# Patient Record
Sex: FEMALE | Race: WHITE | NOT HISPANIC OR LATINO
[De-identification: names, ages, dates, MRNs, and addresses within clinical notes are randomized per-mention and may not be internally consistent; named-entity substitution may affect disease eponyms.]

---

## 2017-01-03 ENCOUNTER — APPOINTMENT (OUTPATIENT)
Dept: ANTEPARTUM | Facility: CLINIC | Age: 27
End: 2017-01-03

## 2017-01-03 ENCOUNTER — ASOB RESULT (OUTPATIENT)
Age: 27
End: 2017-01-03

## 2017-02-22 ENCOUNTER — EMERGENCY (EMERGENCY)
Facility: HOSPITAL | Age: 27
LOS: 1 days | Discharge: HOME | End: 2017-02-22
Attending: EMERGENCY MEDICINE | Admitting: EMERGENCY MEDICINE
Payer: COMMERCIAL

## 2017-02-22 VITALS
RESPIRATION RATE: 20 BRPM | TEMPERATURE: 98 F | OXYGEN SATURATION: 97 % | SYSTOLIC BLOOD PRESSURE: 108 MMHG | DIASTOLIC BLOOD PRESSURE: 74 MMHG | HEART RATE: 124 BPM

## 2017-02-22 VITALS
OXYGEN SATURATION: 100 % | HEART RATE: 105 BPM | TEMPERATURE: 98 F | DIASTOLIC BLOOD PRESSURE: 68 MMHG | SYSTOLIC BLOOD PRESSURE: 108 MMHG | RESPIRATION RATE: 18 BRPM

## 2017-02-22 DIAGNOSIS — O21.9 VOMITING OF PREGNANCY, UNSPECIFIED: ICD-10-CM

## 2017-02-22 DIAGNOSIS — Z3A.27 27 WEEKS GESTATION OF PREGNANCY: ICD-10-CM

## 2017-02-22 DIAGNOSIS — O99.012 ANEMIA COMPLICATING PREGNANCY, SECOND TRIMESTER: ICD-10-CM

## 2017-02-22 DIAGNOSIS — J06.9 ACUTE UPPER RESPIRATORY INFECTION, UNSPECIFIED: ICD-10-CM

## 2017-02-22 LAB
ALBUMIN SERPL ELPH-MCNC: 3.4 G/DL — SIGNIFICANT CHANGE UP (ref 3.3–5)
ALP SERPL-CCNC: 74 U/L — SIGNIFICANT CHANGE UP (ref 40–120)
ALT FLD-CCNC: 18 U/L RC — SIGNIFICANT CHANGE UP (ref 10–45)
ANION GAP SERPL CALC-SCNC: 13 MMOL/L — SIGNIFICANT CHANGE UP (ref 5–17)
APPEARANCE UR: CLEAR — SIGNIFICANT CHANGE UP
AST SERPL-CCNC: 21 U/L — SIGNIFICANT CHANGE UP (ref 10–40)
BACTERIA # UR AUTO: ABNORMAL /HPF
BASOPHILS # BLD AUTO: 0 K/UL — SIGNIFICANT CHANGE UP (ref 0–0.2)
BASOPHILS NFR BLD AUTO: 0.3 % — SIGNIFICANT CHANGE UP (ref 0–2)
BILIRUB SERPL-MCNC: 0.2 MG/DL — SIGNIFICANT CHANGE UP (ref 0.2–1.2)
BILIRUB UR-MCNC: NEGATIVE — SIGNIFICANT CHANGE UP
BUN SERPL-MCNC: 10 MG/DL — SIGNIFICANT CHANGE UP (ref 7–23)
CALCIUM SERPL-MCNC: 8.4 MG/DL — SIGNIFICANT CHANGE UP (ref 8.4–10.5)
CHLORIDE SERPL-SCNC: 101 MMOL/L — SIGNIFICANT CHANGE UP (ref 96–108)
CO2 SERPL-SCNC: 22 MMOL/L — SIGNIFICANT CHANGE UP (ref 22–31)
COLOR SPEC: YELLOW — SIGNIFICANT CHANGE UP
CREAT SERPL-MCNC: 0.55 MG/DL — SIGNIFICANT CHANGE UP (ref 0.5–1.3)
DIFF PNL FLD: ABNORMAL
EOSINOPHIL # BLD AUTO: 0 K/UL — SIGNIFICANT CHANGE UP (ref 0–0.5)
EOSINOPHIL NFR BLD AUTO: 0.8 % — SIGNIFICANT CHANGE UP (ref 0–6)
EPI CELLS # UR: SIGNIFICANT CHANGE UP /HPF
FLUAV H1 2009 PAND RNA SPEC QL NAA+PROBE: DETECTED
GLUCOSE SERPL-MCNC: 99 MG/DL — SIGNIFICANT CHANGE UP (ref 70–99)
GLUCOSE UR QL: NEGATIVE — SIGNIFICANT CHANGE UP
HCT VFR BLD CALC: 29.1 % — LOW (ref 34.5–45)
HGB BLD-MCNC: 9.8 G/DL — LOW (ref 11.5–15.5)
KETONES UR-MCNC: NEGATIVE — SIGNIFICANT CHANGE UP
LEUKOCYTE ESTERASE UR-ACNC: ABNORMAL
LYMPHOCYTES # BLD AUTO: 0.8 K/UL — LOW (ref 1–3.3)
LYMPHOCYTES # BLD AUTO: 12.7 % — LOW (ref 13–44)
MCHC RBC-ENTMCNC: 28.4 PG — SIGNIFICANT CHANGE UP (ref 27–34)
MCHC RBC-ENTMCNC: 33.6 GM/DL — SIGNIFICANT CHANGE UP (ref 32–36)
MCV RBC AUTO: 84.5 FL — SIGNIFICANT CHANGE UP (ref 80–100)
MONOCYTES # BLD AUTO: 0.8 K/UL — SIGNIFICANT CHANGE UP (ref 0–0.9)
MONOCYTES NFR BLD AUTO: 13 % — SIGNIFICANT CHANGE UP (ref 2–14)
NEUTROPHILS # BLD AUTO: 4.6 K/UL — SIGNIFICANT CHANGE UP (ref 1.8–7.4)
NEUTROPHILS NFR BLD AUTO: 73.3 % — SIGNIFICANT CHANGE UP (ref 43–77)
NITRITE UR-MCNC: NEGATIVE — SIGNIFICANT CHANGE UP
PH UR: 6.5 — SIGNIFICANT CHANGE UP (ref 4.8–8)
PLATELET # BLD AUTO: 210 K/UL — SIGNIFICANT CHANGE UP (ref 150–400)
POTASSIUM SERPL-MCNC: 3.5 MMOL/L — SIGNIFICANT CHANGE UP (ref 3.5–5.3)
POTASSIUM SERPL-SCNC: 3.5 MMOL/L — SIGNIFICANT CHANGE UP (ref 3.5–5.3)
PROT SERPL-MCNC: 6.7 G/DL — SIGNIFICANT CHANGE UP (ref 6–8.3)
PROT UR-MCNC: SIGNIFICANT CHANGE UP
RAPID RVP RESULT: DETECTED
RBC # BLD: 3.44 M/UL — LOW (ref 3.8–5.2)
RBC # FLD: 11.6 % — SIGNIFICANT CHANGE UP (ref 10.3–14.5)
RBC CASTS # UR COMP ASSIST: SIGNIFICANT CHANGE UP /HPF (ref 0–2)
SODIUM SERPL-SCNC: 136 MMOL/L — SIGNIFICANT CHANGE UP (ref 135–145)
SP GR SPEC: 1.01 — SIGNIFICANT CHANGE UP (ref 1.01–1.02)
UROBILINOGEN FLD QL: NEGATIVE — SIGNIFICANT CHANGE UP
WBC # BLD: 6.3 K/UL — SIGNIFICANT CHANGE UP (ref 3.8–10.5)
WBC # FLD AUTO: 6.3 K/UL — SIGNIFICANT CHANGE UP (ref 3.8–10.5)
WBC UR QL: SIGNIFICANT CHANGE UP /HPF (ref 0–5)

## 2017-02-22 PROCEDURE — 87633 RESP VIRUS 12-25 TARGETS: CPT

## 2017-02-22 PROCEDURE — 80053 COMPREHEN METABOLIC PANEL: CPT

## 2017-02-22 PROCEDURE — 99284 EMERGENCY DEPT VISIT MOD MDM: CPT | Mod: 25

## 2017-02-22 PROCEDURE — 87798 DETECT AGENT NOS DNA AMP: CPT

## 2017-02-22 PROCEDURE — 81001 URINALYSIS AUTO W/SCOPE: CPT

## 2017-02-22 PROCEDURE — 76815 OB US LIMITED FETUS(S): CPT

## 2017-02-22 PROCEDURE — 99285 EMERGENCY DEPT VISIT HI MDM: CPT | Mod: 25

## 2017-02-22 PROCEDURE — 85027 COMPLETE CBC AUTOMATED: CPT

## 2017-02-22 PROCEDURE — 87581 M.PNEUMON DNA AMP PROBE: CPT

## 2017-02-22 PROCEDURE — 76815 OB US LIMITED FETUS(S): CPT | Mod: 26

## 2017-02-22 PROCEDURE — 87486 CHLMYD PNEUM DNA AMP PROBE: CPT

## 2017-02-22 PROCEDURE — 87086 URINE CULTURE/COLONY COUNT: CPT

## 2017-02-22 RX ORDER — SODIUM CHLORIDE 9 MG/ML
1000 INJECTION, SOLUTION INTRAVENOUS ONCE
Qty: 0 | Refills: 0 | Status: COMPLETED | OUTPATIENT
Start: 2017-02-22 | End: 2017-02-22

## 2017-02-22 RX ORDER — SODIUM CHLORIDE 9 MG/ML
1000 INJECTION INTRAMUSCULAR; INTRAVENOUS; SUBCUTANEOUS ONCE
Qty: 0 | Refills: 0 | Status: COMPLETED | OUTPATIENT
Start: 2017-02-22 | End: 2017-02-22

## 2017-02-22 RX ADMIN — SODIUM CHLORIDE 1000 MILLILITER(S): 9 INJECTION INTRAMUSCULAR; INTRAVENOUS; SUBCUTANEOUS at 07:39

## 2017-02-22 RX ADMIN — Medication 75 MILLIGRAM(S): at 09:33

## 2017-02-22 RX ADMIN — SODIUM CHLORIDE 2000 MILLILITER(S): 9 INJECTION, SOLUTION INTRAVENOUS at 06:54

## 2017-02-22 NOTE — ED PROVIDER NOTE - PROGRESS NOTE DETAILS
FHR 150s. Tachycardia and BP improved. Pt notified of anemia. No dark BM. f/u Ob/Gyn. Pt awaiting fluid bolus to be completed. THOMAS

## 2017-02-22 NOTE — ED PROVIDER NOTE - OBJECTIVE STATEMENT
26F  27 wks pregnant PMH anxiety, hashimoto's thyroiditis p/w fever, chills, dry cough, sinus pressure, myalgias, gen weakness.  Pt. started having runny nose/sore throat this past weekend.  Symptoms worsened on evening of 17.  Measured 101F prior to arrival.  Experienced inc urinary frequency 2 nights ago.  Had 1 episode of NBNB emesis this AM.  Went to [facility] yesterday, had negative flu test, was prescribed Amoxicillin.  Took 1 dose last AM and last PM.  Took 2 Tylenol tabs last night.  No complications in pregnancy thus far.

## 2017-02-22 NOTE — ED PROVIDER NOTE - SHIFT CHANGE DETAILS
[] IVF  [] Labs, UA, RVP  25 y/o  at 27 weeks gestation without complication to date p/w fever sinus congestion and dysuria. Sinus tachycardia on arrival improving with IVF. Continue home Amoxicillin for left sinus infection, supportive car and Ob f/u. If UTI stable for PO abx outpt. f/u with Ob for anemia. NORMA

## 2017-02-22 NOTE — ED ADULT NURSE NOTE - OBJECTIVE STATEMENT
26 y.o female presents to the ed c.o fever and sinus pressure with non productive cough since Saturday . patient is 27 weeks pregnant, , hx of hypothyroidism and anxiety. went to urgent care yesterday, negative flu swab, sent home with amoxicillin for sinus infection, patient has been treating fevers at home with tylenol, last dose around 9pm last night. patient is afebrile, c.o headache, facial pressure, nausea, x1 episode of vomiting this morning, body aches, chills and weakness. patient denies abdominal pain/chest pain/sob/vaginal bleeding/diarrhea. tachycardia to the 110s. Patient undressed and placed into gown, call bell in hand and side rails up for safety. warm blanket provided, vital signs stable, pt in no acute distress.

## 2017-02-22 NOTE — ED ADULT NURSE REASSESSMENT NOTE - NS ED NURSE REASSESS COMMENT FT1
Pt received from PEDRITO Allen in [facility], alert and oriented times three, breathing spontaneous, unlabored without distress on room air, NAD, denies pain, IV fluids infusing, awaits UA sample, RVP results

## 2017-02-22 NOTE — ED ADULT TRIAGE NOTE - CHIEF COMPLAINT QUOTE
fever/27 wks pregnant  Seen at urgent care yesterday and dx with sinusitis. Started on amoxicillin. Flu swab (-) yesterday

## 2017-02-22 NOTE — ED PROVIDER NOTE - ATTENDING CONTRIBUTION TO CARE
I was physically present for the E/M service provided. I agree with above history, physical, and plan which I have reviewed and edited where appropriate. I was physically present for the key portions of the service provided.    See sign out note  Non-toxic appearing  Lungs CTA  +Left sinus TTP  Abd soft NT  FHR 150s on US bedside

## 2017-02-22 NOTE — ED PROCEDURE NOTE - PROCEDURE ADDITIONAL DETAILS
focused Ed ultrasound transabdominal OB to evaluate fetal well being.   uterus scanned in two planes in gray scale and m mode  fetal heart rate measured _150s_  fetal movement noted  impression: live IUP  NORMA  [id number]

## 2017-02-22 NOTE — ED PROVIDER NOTE - MEDICAL DECISION MAKING DETAILS
Likely viral URI vs UTI.  F/U CBC, CMP, UA, urine culture.  1L LR bolus given for hypotension to SBP 91.

## 2017-02-23 LAB
CULTURE RESULTS: NO GROWTH — SIGNIFICANT CHANGE UP
SPECIMEN SOURCE: SIGNIFICANT CHANGE UP

## 2017-05-03 ENCOUNTER — ASOB RESULT (OUTPATIENT)
Age: 27
End: 2017-05-03

## 2017-05-03 ENCOUNTER — APPOINTMENT (OUTPATIENT)
Dept: ANTEPARTUM | Facility: CLINIC | Age: 27
End: 2017-05-03

## 2017-05-06 ENCOUNTER — INPATIENT (INPATIENT)
Facility: HOSPITAL | Age: 27
LOS: 1 days | Discharge: HOME | End: 2017-05-08
Attending: OBSTETRICS & GYNECOLOGY | Admitting: OBSTETRICS & GYNECOLOGY
Payer: COMMERCIAL

## 2017-05-06 VITALS
DIASTOLIC BLOOD PRESSURE: 78 MMHG | TEMPERATURE: 98 F | OXYGEN SATURATION: 96 % | RESPIRATION RATE: 18 BRPM | SYSTOLIC BLOOD PRESSURE: 116 MMHG | HEART RATE: 73 BPM

## 2017-05-06 DIAGNOSIS — Z3A.00 WEEKS OF GESTATION OF PREGNANCY NOT SPECIFIED: ICD-10-CM

## 2017-05-06 DIAGNOSIS — O47.1 FALSE LABOR AT OR AFTER 37 COMPLETED WEEKS OF GESTATION: ICD-10-CM

## 2017-05-06 DIAGNOSIS — O26.899 OTHER SPECIFIED PREGNANCY RELATED CONDITIONS, UNSPECIFIED TRIMESTER: ICD-10-CM

## 2017-05-06 LAB
BASOPHILS # BLD AUTO: 0 K/UL — SIGNIFICANT CHANGE UP (ref 0–0.2)
BLD GP AB SCN SERPL QL: NEGATIVE — SIGNIFICANT CHANGE UP
EOSINOPHIL # BLD AUTO: 0 K/UL — SIGNIFICANT CHANGE UP (ref 0–0.5)
HCT VFR BLD CALC: 28.2 % — LOW (ref 34.5–45)
HGB BLD-MCNC: 9.6 G/DL — LOW (ref 11.5–15.5)
HYPOCHROMIA BLD QL: SLIGHT — SIGNIFICANT CHANGE UP
LYMPHOCYTES # BLD AUTO: 1.6 K/UL — SIGNIFICANT CHANGE UP (ref 1–3.3)
LYMPHOCYTES # BLD AUTO: 10 % — LOW (ref 13–44)
MCHC RBC-ENTMCNC: 25.4 PG — LOW (ref 27–34)
MCHC RBC-ENTMCNC: 34.2 GM/DL — SIGNIFICANT CHANGE UP (ref 32–36)
MCV RBC AUTO: 74.5 FL — LOW (ref 80–100)
MICROCYTES BLD QL: SLIGHT — SIGNIFICANT CHANGE UP
MONOCYTES # BLD AUTO: 0.6 K/UL — SIGNIFICANT CHANGE UP (ref 0–0.9)
MONOCYTES NFR BLD AUTO: 3 % — SIGNIFICANT CHANGE UP (ref 2–14)
NEUTROPHILS # BLD AUTO: 6.3 K/UL — SIGNIFICANT CHANGE UP (ref 1.8–7.4)
NEUTROPHILS NFR BLD AUTO: 87 % — HIGH (ref 43–77)
PLAT MORPH BLD: NORMAL — SIGNIFICANT CHANGE UP
PLATELET # BLD AUTO: 208 K/UL — SIGNIFICANT CHANGE UP (ref 150–400)
RBC # BLD: 3.78 M/UL — LOW (ref 3.8–5.2)
RBC # FLD: 13 % — SIGNIFICANT CHANGE UP (ref 10.3–14.5)
RBC BLD AUTO: ABNORMAL
RH IG SCN BLD-IMP: POSITIVE — SIGNIFICANT CHANGE UP
WBC # BLD: 8.6 K/UL — SIGNIFICANT CHANGE UP (ref 3.8–10.5)
WBC # FLD AUTO: 8.6 K/UL — SIGNIFICANT CHANGE UP (ref 3.8–10.5)

## 2017-05-06 RX ORDER — VENLAFAXINE HCL 75 MG
0 CAPSULE, EXT RELEASE 24 HR ORAL
Qty: 0 | Refills: 0 | COMMUNITY

## 2017-05-06 RX ORDER — SODIUM CHLORIDE 9 MG/ML
1000 INJECTION, SOLUTION INTRAVENOUS
Qty: 0 | Refills: 0 | Status: DISCONTINUED | OUTPATIENT
Start: 2017-05-06 | End: 2017-05-07

## 2017-05-06 RX ORDER — OXYTOCIN 10 UNIT/ML
4 VIAL (ML) INJECTION
Qty: 30 | Refills: 0 | Status: DISCONTINUED | OUTPATIENT
Start: 2017-05-06 | End: 2017-05-08

## 2017-05-06 RX ORDER — VENLAFAXINE HCL 75 MG
75 CAPSULE, EXT RELEASE 24 HR ORAL DAILY
Qty: 0 | Refills: 0 | Status: DISCONTINUED | OUTPATIENT
Start: 2017-05-06 | End: 2017-05-08

## 2017-05-06 RX ORDER — ONDANSETRON 8 MG/1
4 TABLET, FILM COATED ORAL ONCE
Qty: 0 | Refills: 0 | Status: COMPLETED | OUTPATIENT
Start: 2017-05-06 | End: 2017-05-06

## 2017-05-06 RX ORDER — SODIUM CHLORIDE 9 MG/ML
500 INJECTION, SOLUTION INTRAVENOUS ONCE
Qty: 0 | Refills: 0 | Status: COMPLETED | OUTPATIENT
Start: 2017-05-06 | End: 2017-05-06

## 2017-05-06 RX ORDER — LEVOTHYROXINE SODIUM 125 MCG
25 TABLET ORAL DAILY
Qty: 0 | Refills: 0 | Status: DISCONTINUED | OUTPATIENT
Start: 2017-05-06 | End: 2017-05-08

## 2017-05-06 RX ORDER — ONDANSETRON 8 MG/1
4 TABLET, FILM COATED ORAL ONCE
Qty: 0 | Refills: 0 | Status: DISCONTINUED | OUTPATIENT
Start: 2017-05-06 | End: 2017-05-08

## 2017-05-06 RX ORDER — LEVOTHYROXINE SODIUM 125 MCG
1 TABLET ORAL
Qty: 0 | Refills: 0 | COMMUNITY

## 2017-05-06 RX ORDER — CITRIC ACID/SODIUM CITRATE 300-500 MG
15 SOLUTION, ORAL ORAL EVERY 4 HOURS
Qty: 0 | Refills: 0 | Status: DISCONTINUED | OUTPATIENT
Start: 2017-05-06 | End: 2017-05-07

## 2017-05-06 RX ORDER — OXYTOCIN 10 UNIT/ML
333.33 VIAL (ML) INJECTION
Qty: 20 | Refills: 0 | Status: DISCONTINUED | OUTPATIENT
Start: 2017-05-06 | End: 2017-05-06

## 2017-05-06 RX ADMIN — SODIUM CHLORIDE 1000 MILLILITER(S): 9 INJECTION, SOLUTION INTRAVENOUS at 15:27

## 2017-05-06 RX ADMIN — Medication 25 MICROGRAM(S): at 20:00

## 2017-05-06 RX ADMIN — Medication 4 MILLIUNIT(S)/MIN: at 15:45

## 2017-05-06 RX ADMIN — Medication 15 MILLILITER(S): at 16:47

## 2017-05-06 RX ADMIN — ONDANSETRON 4 MILLIGRAM(S): 8 TABLET, FILM COATED ORAL at 22:32

## 2017-05-06 RX ADMIN — Medication 125 MILLIUNIT(S)/MIN: at 23:19

## 2017-05-06 RX ADMIN — SODIUM CHLORIDE 125 MILLILITER(S): 9 INJECTION, SOLUTION INTRAVENOUS at 15:28

## 2017-05-07 LAB
HCT VFR BLD CALC: 28.1 % — LOW (ref 34.5–45)
HGB BLD-MCNC: 9.2 G/DL — LOW (ref 11.5–15.5)
T PALLIDUM AB TITR SER: NEGATIVE — SIGNIFICANT CHANGE UP

## 2017-05-07 RX ORDER — OXYCODONE HYDROCHLORIDE 5 MG/1
5 TABLET ORAL EVERY 4 HOURS
Qty: 0 | Refills: 0 | Status: DISCONTINUED | OUTPATIENT
Start: 2017-05-06 | End: 2017-05-08

## 2017-05-07 RX ORDER — GLYCERIN ADULT
1 SUPPOSITORY, RECTAL RECTAL AT BEDTIME
Qty: 0 | Refills: 0 | Status: DISCONTINUED | OUTPATIENT
Start: 2017-05-07 | End: 2017-05-08

## 2017-05-07 RX ORDER — HYDROCORTISONE 1 %
1 OINTMENT (GRAM) TOPICAL EVERY 4 HOURS
Qty: 0 | Refills: 0 | Status: DISCONTINUED | OUTPATIENT
Start: 2017-05-06 | End: 2017-05-07

## 2017-05-07 RX ORDER — AER TRAVELER 0.5 G/1
1 SOLUTION RECTAL; TOPICAL EVERY 4 HOURS
Qty: 0 | Refills: 0 | Status: DISCONTINUED | OUTPATIENT
Start: 2017-05-06 | End: 2017-05-07

## 2017-05-07 RX ORDER — ACETAMINOPHEN 500 MG
975 TABLET ORAL EVERY 6 HOURS
Qty: 0 | Refills: 0 | Status: COMPLETED | OUTPATIENT
Start: 2017-05-06 | End: 2018-04-04

## 2017-05-07 RX ORDER — FERROUS SULFATE 325(65) MG
325 TABLET ORAL
Qty: 0 | Refills: 0 | Status: DISCONTINUED | OUTPATIENT
Start: 2017-05-07 | End: 2017-05-08

## 2017-05-07 RX ORDER — PRAMOXINE HYDROCHLORIDE 150 MG/15G
1 AEROSOL, FOAM RECTAL EVERY 4 HOURS
Qty: 0 | Refills: 0 | Status: DISCONTINUED | OUTPATIENT
Start: 2017-05-06 | End: 2017-05-07

## 2017-05-07 RX ORDER — LANOLIN
1 OINTMENT (GRAM) TOPICAL EVERY 6 HOURS
Qty: 0 | Refills: 0 | Status: DISCONTINUED | OUTPATIENT
Start: 2017-05-07 | End: 2017-05-08

## 2017-05-07 RX ORDER — IBUPROFEN 200 MG
600 TABLET ORAL EVERY 6 HOURS
Qty: 0 | Refills: 0 | Status: COMPLETED | OUTPATIENT
Start: 2017-05-06 | End: 2018-04-04

## 2017-05-07 RX ORDER — PRAMOXINE HYDROCHLORIDE 150 MG/15G
1 AEROSOL, FOAM RECTAL EVERY 4 HOURS
Qty: 0 | Refills: 0 | Status: DISCONTINUED | OUTPATIENT
Start: 2017-05-07 | End: 2017-05-08

## 2017-05-07 RX ORDER — DIBUCAINE 1 %
1 OINTMENT (GRAM) RECTAL EVERY 4 HOURS
Qty: 0 | Refills: 0 | Status: DISCONTINUED | OUTPATIENT
Start: 2017-05-07 | End: 2017-05-08

## 2017-05-07 RX ORDER — OXYCODONE HYDROCHLORIDE 5 MG/1
5 TABLET ORAL
Qty: 0 | Refills: 0 | Status: DISCONTINUED | OUTPATIENT
Start: 2017-05-06 | End: 2017-05-08

## 2017-05-07 RX ORDER — SODIUM CHLORIDE 9 MG/ML
3 INJECTION INTRAMUSCULAR; INTRAVENOUS; SUBCUTANEOUS EVERY 8 HOURS
Qty: 0 | Refills: 0 | Status: DISCONTINUED | OUTPATIENT
Start: 2017-05-06 | End: 2017-05-07

## 2017-05-07 RX ORDER — ASCORBIC ACID 60 MG
250 TABLET,CHEWABLE ORAL
Qty: 0 | Refills: 0 | Status: DISCONTINUED | OUTPATIENT
Start: 2017-05-07 | End: 2017-05-08

## 2017-05-07 RX ORDER — AER TRAVELER 0.5 G/1
1 SOLUTION RECTAL; TOPICAL EVERY 4 HOURS
Qty: 0 | Refills: 0 | Status: DISCONTINUED | OUTPATIENT
Start: 2017-05-07 | End: 2017-05-08

## 2017-05-07 RX ORDER — DIBUCAINE 1 %
1 OINTMENT (GRAM) RECTAL EVERY 4 HOURS
Qty: 0 | Refills: 0 | Status: DISCONTINUED | OUTPATIENT
Start: 2017-05-06 | End: 2017-05-07

## 2017-05-07 RX ORDER — SIMETHICONE 80 MG/1
80 TABLET, CHEWABLE ORAL EVERY 6 HOURS
Qty: 0 | Refills: 0 | Status: DISCONTINUED | OUTPATIENT
Start: 2017-05-07 | End: 2017-05-08

## 2017-05-07 RX ORDER — TETANUS TOXOID, REDUCED DIPHTHERIA TOXOID AND ACELLULAR PERTUSSIS VACCINE, ADSORBED 5; 2.5; 8; 8; 2.5 [IU]/.5ML; [IU]/.5ML; UG/.5ML; UG/.5ML; UG/.5ML
0.5 SUSPENSION INTRAMUSCULAR ONCE
Qty: 0 | Refills: 0 | Status: DISCONTINUED | OUTPATIENT
Start: 2017-05-07 | End: 2017-05-08

## 2017-05-07 RX ORDER — OXYTOCIN 10 UNIT/ML
41.67 VIAL (ML) INJECTION
Qty: 20 | Refills: 0 | Status: DISCONTINUED | OUTPATIENT
Start: 2017-05-06 | End: 2017-05-07

## 2017-05-07 RX ORDER — DOCUSATE SODIUM 100 MG
100 CAPSULE ORAL
Qty: 0 | Refills: 0 | Status: DISCONTINUED | OUTPATIENT
Start: 2017-05-07 | End: 2017-05-08

## 2017-05-07 RX ORDER — MAGNESIUM HYDROXIDE 400 MG/1
30 TABLET, CHEWABLE ORAL
Qty: 0 | Refills: 0 | Status: DISCONTINUED | OUTPATIENT
Start: 2017-05-07 | End: 2017-05-08

## 2017-05-07 RX ORDER — DIPHENHYDRAMINE HCL 50 MG
25 CAPSULE ORAL EVERY 6 HOURS
Qty: 0 | Refills: 0 | Status: DISCONTINUED | OUTPATIENT
Start: 2017-05-07 | End: 2017-05-08

## 2017-05-07 RX ORDER — KETOROLAC TROMETHAMINE 30 MG/ML
30 SYRINGE (ML) INJECTION ONCE
Qty: 0 | Refills: 0 | Status: DISCONTINUED | OUTPATIENT
Start: 2017-05-06 | End: 2017-05-07

## 2017-05-07 RX ORDER — OXYTOCIN 10 UNIT/ML
41.67 VIAL (ML) INJECTION
Qty: 20 | Refills: 0 | Status: DISCONTINUED | OUTPATIENT
Start: 2017-05-07 | End: 2017-05-08

## 2017-05-07 RX ORDER — IBUPROFEN 200 MG
600 TABLET ORAL EVERY 6 HOURS
Qty: 0 | Refills: 0 | Status: DISCONTINUED | OUTPATIENT
Start: 2017-05-07 | End: 2017-05-08

## 2017-05-07 RX ORDER — ACETAMINOPHEN 500 MG
975 TABLET ORAL EVERY 6 HOURS
Qty: 0 | Refills: 0 | Status: DISCONTINUED | OUTPATIENT
Start: 2017-05-07 | End: 2017-05-08

## 2017-05-07 RX ORDER — HYDROCORTISONE 1 %
1 OINTMENT (GRAM) TOPICAL EVERY 4 HOURS
Qty: 0 | Refills: 0 | Status: DISCONTINUED | OUTPATIENT
Start: 2017-05-07 | End: 2017-05-08

## 2017-05-07 RX ORDER — SODIUM CHLORIDE 9 MG/ML
3 INJECTION INTRAMUSCULAR; INTRAVENOUS; SUBCUTANEOUS EVERY 8 HOURS
Qty: 0 | Refills: 0 | Status: DISCONTINUED | OUTPATIENT
Start: 2017-05-07 | End: 2017-05-08

## 2017-05-07 RX ADMIN — Medication 75 MILLIGRAM(S): at 13:01

## 2017-05-07 RX ADMIN — Medication 30 MILLIGRAM(S): at 00:54

## 2017-05-07 RX ADMIN — Medication 600 MILLIGRAM(S): at 16:00

## 2017-05-07 RX ADMIN — SODIUM CHLORIDE 3 MILLILITER(S): 9 INJECTION INTRAMUSCULAR; INTRAVENOUS; SUBCUTANEOUS at 06:00

## 2017-05-07 RX ADMIN — Medication 975 MILLIGRAM(S): at 18:21

## 2017-05-07 RX ADMIN — Medication 600 MILLIGRAM(S): at 15:28

## 2017-05-07 RX ADMIN — Medication 600 MILLIGRAM(S): at 10:00

## 2017-05-07 RX ADMIN — Medication 250 MILLIGRAM(S): at 18:20

## 2017-05-07 RX ADMIN — Medication 600 MILLIGRAM(S): at 09:25

## 2017-05-07 RX ADMIN — Medication 25 MICROGRAM(S): at 22:16

## 2017-05-07 RX ADMIN — Medication 325 MILLIGRAM(S): at 18:20

## 2017-05-08 ENCOUNTER — TRANSCRIPTION ENCOUNTER (OUTPATIENT)
Age: 27
End: 2017-05-08

## 2017-05-08 VITALS
HEART RATE: 74 BPM | SYSTOLIC BLOOD PRESSURE: 108 MMHG | DIASTOLIC BLOOD PRESSURE: 75 MMHG | TEMPERATURE: 98 F | RESPIRATION RATE: 18 BRPM | OXYGEN SATURATION: 98 %

## 2017-05-08 PROCEDURE — 85018 HEMOGLOBIN: CPT

## 2017-05-08 PROCEDURE — 86900 BLOOD TYPING SEROLOGIC ABO: CPT

## 2017-05-08 PROCEDURE — 86850 RBC ANTIBODY SCREEN: CPT

## 2017-05-08 PROCEDURE — 59025 FETAL NON-STRESS TEST: CPT

## 2017-05-08 PROCEDURE — G0463: CPT

## 2017-05-08 PROCEDURE — 85027 COMPLETE CBC AUTOMATED: CPT

## 2017-05-08 PROCEDURE — 86780 TREPONEMA PALLIDUM: CPT

## 2017-05-08 PROCEDURE — 86901 BLOOD TYPING SEROLOGIC RH(D): CPT

## 2017-05-08 PROCEDURE — 59050 FETAL MONITOR W/REPORT: CPT

## 2017-05-08 RX ORDER — AMOXICILLIN 250 MG/5ML
1 SUSPENSION, RECONSTITUTED, ORAL (ML) ORAL
Qty: 0 | Refills: 0 | COMMUNITY

## 2017-05-08 RX ORDER — IBUPROFEN 200 MG
1 TABLET ORAL
Qty: 0 | Refills: 0 | COMMUNITY
Start: 2017-05-08

## 2017-05-08 RX ORDER — ACETAMINOPHEN 500 MG
3 TABLET ORAL
Qty: 0 | Refills: 0 | COMMUNITY
Start: 2017-05-08

## 2017-05-08 RX ADMIN — Medication 600 MILLIGRAM(S): at 05:41

## 2017-05-08 RX ADMIN — Medication 100 MILLIGRAM(S): at 11:23

## 2017-05-08 RX ADMIN — Medication 1 TABLET(S): at 11:23

## 2017-05-08 RX ADMIN — Medication 600 MILLIGRAM(S): at 06:41

## 2017-05-08 RX ADMIN — Medication 600 MILLIGRAM(S): at 11:23

## 2017-05-08 RX ADMIN — Medication 75 MILLIGRAM(S): at 11:25

## 2017-05-08 NOTE — DISCHARGE NOTE OB - MEDICATION SUMMARY - MEDICATIONS TO TAKE
I will START or STAY ON the medications listed below when I get home from the hospital:    acetaminophen 325 mg oral tablet  -- 3 tab(s) by mouth every 6 hours  -- Indication: For pain    ibuprofen 600 mg oral tablet  -- 1 tab(s) by mouth every 6 hours  -- Indication: For pain    Effexor 75 mg oral tablet  --  by mouth once a day  -- Indication: For anxiety    Synthroid 25 mcg (0.025 mg) oral tablet  -- 1 tab(s) by mouth once a day  -- Indication: For hypothyroidism

## 2017-05-08 NOTE — DISCHARGE NOTE OB - PATIENT PORTAL LINK FT
“You can access the Follow[facility] Patient Portal, offered by [facility], by registering with the following website: [email or web address]”

## 2017-05-08 NOTE — DISCHARGE NOTE OB - CARE PROVIDER_API CALL
Robin Lin (MD), Obstetrics  Gynecology  [address] [facility] [place] [address]  Phone: [phone]  Fax: [phone]

## 2017-05-14 ENCOUNTER — EMERGENCY (EMERGENCY)
Facility: HOSPITAL | Age: 27
LOS: 1 days | Discharge: HOME | End: 2017-05-14
Attending: EMERGENCY MEDICINE | Admitting: EMERGENCY MEDICINE
Payer: COMMERCIAL

## 2017-05-14 VITALS
OXYGEN SATURATION: 100 % | RESPIRATION RATE: 18 BRPM | TEMPERATURE: 98 F | SYSTOLIC BLOOD PRESSURE: 136 MMHG | DIASTOLIC BLOOD PRESSURE: 78 MMHG | HEART RATE: 85 BPM

## 2017-05-14 VITALS
SYSTOLIC BLOOD PRESSURE: 116 MMHG | HEART RATE: 93 BPM | OXYGEN SATURATION: 99 % | TEMPERATURE: 98 F | DIASTOLIC BLOOD PRESSURE: 82 MMHG | RESPIRATION RATE: 18 BRPM

## 2017-05-14 DIAGNOSIS — R11.2 NAUSEA WITH VOMITING, UNSPECIFIED: ICD-10-CM

## 2017-05-14 LAB
ALBUMIN SERPL ELPH-MCNC: 3.8 G/DL — SIGNIFICANT CHANGE UP (ref 3.3–5)
ALP SERPL-CCNC: 130 U/L — HIGH (ref 40–120)
ALT FLD-CCNC: 38 U/L RC — SIGNIFICANT CHANGE UP (ref 10–45)
ANION GAP SERPL CALC-SCNC: 18 MMOL/L — HIGH (ref 5–17)
APPEARANCE UR: ABNORMAL
AST SERPL-CCNC: 25 U/L — SIGNIFICANT CHANGE UP (ref 10–40)
BACTERIA # UR AUTO: ABNORMAL /HPF
BASOPHILS # BLD AUTO: 0 K/UL — SIGNIFICANT CHANGE UP (ref 0–0.2)
BASOPHILS NFR BLD AUTO: 0.4 % — SIGNIFICANT CHANGE UP (ref 0–2)
BILIRUB SERPL-MCNC: 0.2 MG/DL — SIGNIFICANT CHANGE UP (ref 0.2–1.2)
BILIRUB UR-MCNC: NEGATIVE — SIGNIFICANT CHANGE UP
BUN SERPL-MCNC: 14 MG/DL — SIGNIFICANT CHANGE UP (ref 7–23)
CALCIUM SERPL-MCNC: 9.1 MG/DL — SIGNIFICANT CHANGE UP (ref 8.4–10.5)
CHLORIDE SERPL-SCNC: 104 MMOL/L — SIGNIFICANT CHANGE UP (ref 96–108)
CO2 SERPL-SCNC: 20 MMOL/L — LOW (ref 22–31)
COLOR SPEC: YELLOW — SIGNIFICANT CHANGE UP
CREAT SERPL-MCNC: 0.77 MG/DL — SIGNIFICANT CHANGE UP (ref 0.5–1.3)
DIFF PNL FLD: ABNORMAL
EOSINOPHIL # BLD AUTO: 0 K/UL — SIGNIFICANT CHANGE UP (ref 0–0.5)
EOSINOPHIL NFR BLD AUTO: 0.2 % — SIGNIFICANT CHANGE UP (ref 0–6)
EPI CELLS # UR: SIGNIFICANT CHANGE UP /HPF
GLUCOSE SERPL-MCNC: 99 MG/DL — SIGNIFICANT CHANGE UP (ref 70–99)
GLUCOSE UR QL: NEGATIVE — SIGNIFICANT CHANGE UP
HCT VFR BLD CALC: 35.4 % — SIGNIFICANT CHANGE UP (ref 34.5–45)
HGB BLD-MCNC: 11.8 G/DL — SIGNIFICANT CHANGE UP (ref 11.5–15.5)
KETONES UR-MCNC: NEGATIVE — SIGNIFICANT CHANGE UP
LEUKOCYTE ESTERASE UR-ACNC: ABNORMAL
LYMPHOCYTES # BLD AUTO: 1.7 K/UL — SIGNIFICANT CHANGE UP (ref 1–3.3)
LYMPHOCYTES # BLD AUTO: 20.5 % — SIGNIFICANT CHANGE UP (ref 13–44)
MCHC RBC-ENTMCNC: 26 PG — LOW (ref 27–34)
MCHC RBC-ENTMCNC: 33.4 GM/DL — SIGNIFICANT CHANGE UP (ref 32–36)
MCV RBC AUTO: 77.8 FL — LOW (ref 80–100)
MONOCYTES # BLD AUTO: 0.5 K/UL — SIGNIFICANT CHANGE UP (ref 0–0.9)
MONOCYTES NFR BLD AUTO: 6.5 % — SIGNIFICANT CHANGE UP (ref 2–14)
NEUTROPHILS # BLD AUTO: 6.2 K/UL — SIGNIFICANT CHANGE UP (ref 1.8–7.4)
NEUTROPHILS NFR BLD AUTO: 72.5 % — SIGNIFICANT CHANGE UP (ref 43–77)
NITRITE UR-MCNC: NEGATIVE — SIGNIFICANT CHANGE UP
PH UR: 6 — SIGNIFICANT CHANGE UP (ref 5–8)
PLATELET # BLD AUTO: 333 K/UL — SIGNIFICANT CHANGE UP (ref 150–400)
POTASSIUM SERPL-MCNC: 4 MMOL/L — SIGNIFICANT CHANGE UP (ref 3.5–5.3)
POTASSIUM SERPL-SCNC: 4 MMOL/L — SIGNIFICANT CHANGE UP (ref 3.5–5.3)
PROT SERPL-MCNC: 7.4 G/DL — SIGNIFICANT CHANGE UP (ref 6–8.3)
PROT UR-MCNC: SIGNIFICANT CHANGE UP
RBC # BLD: 4.55 M/UL — SIGNIFICANT CHANGE UP (ref 3.8–5.2)
RBC # FLD: 13.4 % — SIGNIFICANT CHANGE UP (ref 10.3–14.5)
RBC CASTS # UR COMP ASSIST: SIGNIFICANT CHANGE UP /HPF (ref 0–2)
SODIUM SERPL-SCNC: 142 MMOL/L — SIGNIFICANT CHANGE UP (ref 135–145)
SP GR SPEC: 1.01 — SIGNIFICANT CHANGE UP (ref 1.01–1.02)
UROBILINOGEN FLD QL: NEGATIVE — SIGNIFICANT CHANGE UP
WBC # BLD: 8.5 K/UL — SIGNIFICANT CHANGE UP (ref 3.8–10.5)
WBC # FLD AUTO: 8.5 K/UL — SIGNIFICANT CHANGE UP (ref 3.8–10.5)
WBC UR QL: SIGNIFICANT CHANGE UP /HPF (ref 0–5)

## 2017-05-14 PROCEDURE — 96375 TX/PRO/DX INJ NEW DRUG ADDON: CPT

## 2017-05-14 PROCEDURE — 81001 URINALYSIS AUTO W/SCOPE: CPT

## 2017-05-14 PROCEDURE — 80053 COMPREHEN METABOLIC PANEL: CPT

## 2017-05-14 PROCEDURE — 87086 URINE CULTURE/COLONY COUNT: CPT

## 2017-05-14 PROCEDURE — 99284 EMERGENCY DEPT VISIT MOD MDM: CPT

## 2017-05-14 PROCEDURE — 96374 THER/PROPH/DIAG INJ IV PUSH: CPT

## 2017-05-14 PROCEDURE — 99284 EMERGENCY DEPT VISIT MOD MDM: CPT | Mod: 25

## 2017-05-14 PROCEDURE — 85027 COMPLETE CBC AUTOMATED: CPT

## 2017-05-14 RX ORDER — SODIUM CHLORIDE 9 MG/ML
2000 INJECTION INTRAMUSCULAR; INTRAVENOUS; SUBCUTANEOUS ONCE
Qty: 0 | Refills: 0 | Status: COMPLETED | OUTPATIENT
Start: 2017-05-14 | End: 2017-05-14

## 2017-05-14 RX ORDER — ACETAMINOPHEN 500 MG
1000 TABLET ORAL ONCE
Qty: 0 | Refills: 0 | Status: COMPLETED | OUTPATIENT
Start: 2017-05-14 | End: 2017-05-14

## 2017-05-14 RX ORDER — CEPHALEXIN 500 MG
1 CAPSULE ORAL
Qty: 14 | Refills: 0 | OUTPATIENT
Start: 2017-05-14 | End: 2017-05-21

## 2017-05-14 RX ORDER — ONDANSETRON 8 MG/1
4 TABLET, FILM COATED ORAL ONCE
Qty: 0 | Refills: 0 | Status: COMPLETED | OUTPATIENT
Start: 2017-05-14 | End: 2017-05-14

## 2017-05-14 RX ORDER — CEPHALEXIN 500 MG
500 CAPSULE ORAL ONCE
Qty: 0 | Refills: 0 | Status: COMPLETED | OUTPATIENT
Start: 2017-05-14 | End: 2017-05-14

## 2017-05-14 RX ADMIN — Medication 400 MILLIGRAM(S): at 16:13

## 2017-05-14 RX ADMIN — SODIUM CHLORIDE 2000 MILLILITER(S): 9 INJECTION INTRAMUSCULAR; INTRAVENOUS; SUBCUTANEOUS at 15:19

## 2017-05-14 RX ADMIN — Medication 500 MILLIGRAM(S): at 18:27

## 2017-05-14 RX ADMIN — ONDANSETRON 4 MILLIGRAM(S): 8 TABLET, FILM COATED ORAL at 15:19

## 2017-05-14 RX ADMIN — Medication 1000 MILLIGRAM(S): at 17:43

## 2017-05-14 NOTE — ED PROVIDER NOTE - PHYSICAL EXAMINATION
Attending Glasgow: Gen: NAD, heent: atrauamtic, eomi, perrla, dry mucous membranes, op pink, uvula midline, neck; nttp, no nuchal rigidity, chest: nttp, no crepitus, cv: rrr, no murmurs, lungs: ctab, abd: soft, nontender, nondistended, no peritoneal signs, +BS, no guarding, ext: wwp, neg homans, skin: no rash, neuro: awake and alert, following commands, speech clear, sensation and strength intact, no focal deficits

## 2017-05-14 NOTE — ED PROVIDER NOTE - CARE PLAN
Principal Discharge DX:	UTI (urinary tract infection)  Instructions for follow-up, activity and diet:	Complete course of K Principal Discharge DX:	UTI (urinary tract infection)  Instructions for follow-up, activity and diet:	Complete course of Keflex as prescribed.  Follow-up with your primary doctor and obgyn.  Return for any new/worsening symptoms or concerns.

## 2017-05-14 NOTE — ED PROVIDER NOTE - OBJECTIVE STATEMENT
Attending Glasgow: 28 y/o female s/p recent vaginal delivery 5 days ago, uncomplicated presenting with n/v/d. pt states symptoms started yesterday. multiple episodes of nbnb vomiting. multiple episodes of brown stool. no blood in the stool. no sick contacts. decreased po intact. denies any abdominal pain. low grade temp of 99 at home. still has some spotting since delivery.   GYN: deja

## 2017-05-14 NOTE — ED ADULT NURSE NOTE - OBJECTIVE STATEMENT
26 Y/O F via walkin presenting with abd pain, n,v,d and a lower grade fever as per pt. Pt states this started a few days ago. She states she may have contracted a GI bug. She states she had a vaginal delivery on the 6th. She states she had vaginal bleeding that is normal. She states she has decrease PO intake. She states she is not breastfeeding. Pt is aaox4. Gross neuro intact. Lungs clear throughout bilat. S1  and S2 heard. Abd soft, nontender, nondistended. + bs in all 4 quadrants. Pt states she has dysuria. Skin w.d.i. Denies soaking 1 pad per hour. Pt denies chest pain, sob, dizziness, weakness. Safety and comfort measures maintained. Family at bedside.

## 2017-05-14 NOTE — ED PROVIDER NOTE - MEDICAL DECISION MAKING DETAILS
Attending Glasgow: 26 y/o female s/p recent vaginal delivery presenting with n/v/d. pt has mild vaginal spotting since delivery. no abdominal pain currently and abd soft making endometritis less likely. symptoms suggestive of gastroenteritis. will hydrate, d/w ob and re-eval.

## 2017-05-14 NOTE — ED PROVIDER NOTE - PLAN OF CARE
Complete course of K Complete course of Keflex as prescribed.  Follow-up with your primary doctor and obgyn.  Return for any new/worsening symptoms or concerns.

## 2017-05-14 NOTE — ED PROVIDER NOTE - PROGRESS NOTE DETAILS
Patient cleared by OBGYN, no concern for endometritis.  patient well-appearing.  will d/c with keflex and followup.     Dafne Davis MD

## 2017-05-15 LAB
CULTURE RESULTS: SIGNIFICANT CHANGE UP
SPECIMEN SOURCE: SIGNIFICANT CHANGE UP

## 2017-05-16 NOTE — ED POST DISCHARGE NOTE - OTHER COMMUNICATION
spoke w patient and explained that she needs a repeat urine culture, her symptoms are mildly improved. continue antibiotic and follow up with PMD. come back to ER if fever, chills or back pain occur. Explained this to patient - Chucky Blanchard PA-C

## 2018-11-30 NOTE — PATIENT PROFILE OB - ANTEPARTUM INPATIENT
Physical Therapy Evaluation    Visit Count: 1   Plan of Care: 11/30/2018 Through: 1/25/2019  Insurance Information: [facility]/CHOICE PLUS    NO AUTH  $3500 DED(MET)  $6850 OUT OF POCKET(MET)  100% COV  NO VISIT LIMITS,MEDICAL REVIEW AFTER 30TH VISIT(0 USED)  CPT CODE 83692 IS VALID AND BILLABLE  CPT CODE 62218 IS VALID AND BILLABLE  REF#[id number]    Referred by: Sg Balbuena MD; Next provider visit (if known/scheduled): As needed  Medical Diagnosis (from order):  Cervical myofascial pain  Treatment Diagnosis: Cervical and right upper trap symptoms with increased pain/symptoms, impaired posture, impaired strength, impaired scapulohumeral rhythm, impaired muscle length/flexibility, impaired activity tolerance    Date of onset/injury: 11-30-14  Diagnosis Precautions: None  Chart reviewed at time of initial evaluation (relevant co-morbidities, allergies, tests and medications listed):   Patient Active Problem List   Diagnosis   • PCOS (polycystic ovarian syndrome)   • Chronic neck pain   • Hyperpiesia   • Migraine   • Essential hypertension   • Gastroesophageal reflux disease   • Labial hypertrophy   • Vulvar furuncle   • Ductal carcinoma in situ (DCIS) of left breast   • Cervical spondylosis without myelopathy     SUBJECTIVE   Description of Problem and/or Mechanism of Injury: Original neck injury 4 years ago, when 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  Patient underwent extensive conservative treatment including PT, chiropractic care and massage therapy.  Underwent Radio Frequency Ablation 10-24-18 and reports no headaches since.    Patient also states that she is also currently working with OT as she was diagnosed with left breast cancer earlier this year and underwent a lumpectomy (6-27-18) and 21 treatments of radiation.      Pain:  Intensity (0-10 scale): Current: 1; Pain Range (best-worst): 1-2  Location: Right upper trap  Quality/Description: Ache, Sore,  Tight  Relieving/Alleviating factors: rest, ice  Any tripping, stumbling, loss of balance: No  Any changes in bowel/bladder function: No  Pain with coughing and sneezing: No  Any numbness/tingling or radiating pain: No  Night pain: No    Function:  Limitations/exacerbating factors: pain, difficulty with computer tasks, driving, grooming/hygiene/self care activities, house/yard work, lifting/carrying, meal/food prep, pushing/pulling, reaching motions  Prior level: independent with all activities of daily living and instrumental activities of daily living  Patient Goal: Decrease pain and tightness.  Improve right UE strength to allow return to all activities.    Prior Treatment: chiropractic services and massage services in the past year for current condition. Hospitalization, home health services or skilled nursing facility in the last 30 days: No, per patient.  Home Environment/Social Support: Patient lives with children; intermittent assist from family/friends available.    Safety:  Do you feel safe at home, work and/or school? yes, per patient  Patient denies 2 or more falls or an unexplained fall with injury in the last year, further testing not required     OBJECTIVE   Posture/Observation:  Flexed spine, rounded shoulders and forward head.  Right > left upper trap overuse.      Cervical Range of Motion (degrees)  Date Initial    Flexion (80-90) 35 ° *   Extension (70) 50 °    Lateral Flexion Left (20-45) 25 ° *   Lateral Flexion Right (20-45) 30 °    Rotation Left (70-90)  50 ° *   Rotation Right (70-90)  65 °    standard testing positions unless otherwise noted; ranges are reported in active range of motion unless noted AA=active assistive range of motion and P=passive range of motion; norms included in (); * =more tightness than pain    Strength (out of 5)   Left Right   Date Initial Initial   Deltoids (C5) 4+ 4   Shoulder External Rotators (C6/7) 4 3+   Shoulder Internal Rotators (C6/7) 4+ 4   Biceps (C6) 5  4+   standard testing positions unless otherwise noted; nerve root level included in ( ); * =pain  Weaker on right without reports of increased pain.    Muscle Flexibility    Left Right  Left Right   Sub Occipitals  X Scalenes  X   Levator Scapulae  X Upper Trapezius  X   Pectoralis Major X X Pectoralis Minor X X   Latissimus        X=impairment assessed; amount of impairment maybe indicated by min=minimal impairment, mod=moderate impairment, sev=severe impairment    Palpation:  Tight and tender right > left upper trap/levator    Shoulder ROM  Motion Left Right   Date 11-30-18 11-30-18   Flexion 130 °  160 °    Scaption 135 °  155 °    Abduction 110 °  150 °    ER NT NT   IR NT NT   Tight on left due to left lumpectomy on 6-27-18.  Right upper trap tightness at end range of all right shoulder motions.    Outcome Measures:   Neck Disability Index (NDI): Score: NDI Score Calculated: 20 % at initial evaluation (scored 0-100, higher score indicates higher disability)     Initial Treatment   Initial evaluation completed.    Therapeutic Exercise:   Exercise Repetitions Sets Position/Cues   Cervical ROM 2 1 All except extension   Low trap scapular squeezes 5 1 Cues for upper trap relaxation   Doorway pec stretches 2 1          Comments: Patient reports difficulty preventing upper trap overuse    Manual Therapy:   Brief supine manual traction and suboccipital release    Skilled input: Exercise instruction and cues for improve upper trap relaxation/lower trap recruitment.    Initial Home Program:  Exercise: Date issued Date DC Comments   Cervical ROM 11-30-18  All except extension   Low trap scapular squeezes 11-30-18     Doorway pec stretches 11-30-18     Ice 11-30-18             Writer verbally educated the patient and received verbal consent from the patient on hand placement, positioning of patient, and techniques to be performed today including HEP and how they are pertinent to the patient's plan of care.      Suggestions for next session as indicated: progress per plan of care, manual therapy as needed.  Progress exercises for scapular control, upper trap relaxation and gradual right UE strength as tolerated.    ASSESSMENT   44 year old female patient has signs and symptoms consistent with right sided neck pain that has reported functional limitations listed above.     Patient will benefit from skilled therapy and rehab potential is good based on assessment above   Predicted patient presentation: Low (stable) - Patient comorbidities and complexities, as defined above, will have little effect on progress for prescribed plan of care.    PLAN   Goals: To be obtained by end of this plan of care:  1. Patient will be independent with progressed and modified home exercise program   2. Decrease pain/symptoms to 1/10 at worst  3. Improve involved strength to 4+/5  4. Improve involved range of motion to 160 °   through improvements listed above patient will:  5. Be able to complete upper body dressing without pain/difficulty  6. Be able to reach overhead, out to side, behind back without pain/difficulty to improve completion of household tasks  7. Be able to sleep 6 hours without disruption from pain  8. Be able to sit for 60 minutes with minimal pain/difficulty to improve computer/desk work  9. Be able to lift 25# with minimal pain/difficulty to improve returning to community activities  10. Neck Disability Index: Patient will complete form to reflect an improved score from initial score of 20% to less than or equal to 5% (scored 0-100, higher score indicates higher disability) to indicate pt reported improvement in function/disability/impairment (minimal detectable change: 21%).     The following skilled interventions to be implemented to achieve above:  Manual Therapy (14689)  Neuromuscular Re-Education (69281)  Therapeutic Activity (88083)  Therapeutic Exercise (25301)  Ultrasound/Phonophoresis  (93257)    Frequency/Duration: 2 times per week for 8 weeks with tapering as the patient progresses    patient involved in and agreed to plan of care and goals.   Attendance policy provided at time of evaluation.        Patient Education:   Who will be receiving education: patient  Are they ready to learn: yes  Preferred learning style: written, verbal, demonstration  Barriers to learning: no barriers apparent at this time   Result of initial outlined education: Verbalizes understanding, Demonstrates understanding and Needs reinforcement    THERAPY DAILY BILLING   Insurance: 1. AMDL 2. N/A    Evaluation Procedures:  Physical Therapy Evaluation: Low Complexity    Timed Procedures:  Therapeutic Exercise, 15 minutes    Untimed Procedures:  No untimed codes were used on this date of service    Total Treatment Time: 45 minutes   Yes

## 2019-02-27 ENCOUNTER — EMERGENCY (EMERGENCY)
Facility: HOSPITAL | Age: 29
LOS: 1 days | End: 2019-02-27

## 2019-02-27 VITALS
TEMPERATURE: 99 F | SYSTOLIC BLOOD PRESSURE: 133 MMHG | RESPIRATION RATE: 18 BRPM | HEART RATE: 85 BPM | WEIGHT: 154.98 LBS | HEIGHT: 65 IN | OXYGEN SATURATION: 100 % | DIASTOLIC BLOOD PRESSURE: 89 MMHG

## 2019-02-27 NOTE — ED ADULT TRIAGE NOTE - CHIEF COMPLAINT QUOTE
abd pain/cramping with vaginal bleeding x 2 weeks -  possible LMP 2/16/19. Patient has an appt scheduled with OBGYN tomorrow

## 2019-03-15 ENCOUNTER — RESULT REVIEW (OUTPATIENT)
Age: 29
End: 2019-03-15

## 2019-09-04 ENCOUNTER — APPOINTMENT (OUTPATIENT)
Dept: ORTHOPEDIC SURGERY | Facility: CLINIC | Age: 29
End: 2019-09-04
Payer: COMMERCIAL

## 2019-09-04 ENCOUNTER — TRANSCRIPTION ENCOUNTER (OUTPATIENT)
Age: 29
End: 2019-09-04

## 2019-09-04 VITALS
BODY MASS INDEX: 24.99 KG/M2 | WEIGHT: 150 LBS | DIASTOLIC BLOOD PRESSURE: 90 MMHG | HEIGHT: 65 IN | SYSTOLIC BLOOD PRESSURE: 124 MMHG | HEART RATE: 111 BPM

## 2019-09-04 PROCEDURE — 73110 X-RAY EXAM OF WRIST: CPT | Mod: RT

## 2019-09-04 PROCEDURE — 99203 OFFICE O/P NEW LOW 30 MIN: CPT

## 2019-09-04 NOTE — PHYSICAL EXAM
[de-identified] : Constitutional: Well-nourished, well-developed, No acute distress\par Respiratory:  Good respiratory effort, no SOB\par Lymphatic: No regional lymphadenopathy, no lymphedema\par Psychiatric: Pleasant and normal affect, alert and oriented x3\par Skin: Clean dry and intact B/L UE/LE\par Musculoskeletal: normal except where as noted in regional exam\par \par \par right Wrist:\par APPEARANCE: + swelling at the distal radius, MCPs\par POSITIVE TENDERNESS: + radial syloid, snuffbox 2nd, 3rd, and 4th mcps\par NONTENDER:  scapholunate, DRUJ, TFCC, FCU/FCR, ECU/ECRL/ECRB, ulnar styloid, CMC, and MCP joints. \par ROM: limited in all directions due to pain\par RESISTIVE TESTING: pain with wrist flexion/extension, and radial/ulnar deviation. \par Vasc: 2+ radial pulse\par Neuro: AIN, PIN, Ulnar nerve intact to motor\par Sensation: Intact to light touch throughout\par B/L Shoulders:  No asymmetry, malalignment, or swelling, Full ROM, 5/5 strength in flexion/ext, Abd/Add, IR/ER, Joints stable\par B/L Elbows:  No asymmetry, malalignment, or swelling, Full ROM, 5/5 strength in flex/ext, pronation/supination, Joints stable\par \par  [de-identified] : \par The following radiographs were ordered and read by me during this patient's visit. I reviewed each radiograph in detail with the patient and discussed the findings as highlighted below. \par \par 3 views of the right wrist were obtained today.  In one view there is a hairline transverse opacity in the radial styloid that may represent a small fracture.  There is also another small hairline opacity at the base of the 5th metacarpal seen on AP view.  There is no displacement or malalignment. There are no degenerative changes seen. There is no malalignment. No other obvious osseous abnormality. Otherwise unremarkable.

## 2019-09-04 NOTE — DISCUSSION/SUMMARY
[de-identified] : Bello is a 29 year old female who presents with right wrist pain after a fall onto an outstretched hand.  On exam she is particularly tender over the radial styloid and may have a small hairline fracture there.  She is not as tender over the MCP joints.  I have placed her in a wrist splint and will see her back in 1 week for repeat imaging and to monitor symptoms.  She can take NSAIDs or tylenol as needed for pain.\par \par \par This note was generated using dragon medical dictation software.  A reasonable effort has been made for proofreading its contents, but typos may still remain.  If there are any questions or points of clarification needed please notify my office.\par \par Alivia Wade MD, EdM\par Sports Medicine PM&R

## 2019-09-04 NOTE — HISTORY OF PRESENT ILLNESS
[de-identified] : Bello is a 29F who presents with right wrist injury after a fall.  Patient reports she was washing her dog yesterday when it pulled her forward and she fell onto her outstretched hand.  She had immediate pain, but did not have significant swelling until this morning.  Pain is mostly located at the radial styloid.  It is described as sharp and throbbing and radiates into her fingers. Worse with wrist flexion/extension, better with ice.  Denies numbness, tingling, weakness.  She was seen at [facility] this morning and had xrays. She was placed in a splint and referred to clinic.

## 2019-09-11 ENCOUNTER — APPOINTMENT (OUTPATIENT)
Dept: ORTHOPEDIC SURGERY | Facility: CLINIC | Age: 29
End: 2019-09-11

## 2019-09-12 ENCOUNTER — APPOINTMENT (OUTPATIENT)
Dept: ORTHOPEDIC SURGERY | Facility: CLINIC | Age: 29
End: 2019-09-12
Payer: COMMERCIAL

## 2019-09-12 VITALS
HEIGHT: 65 IN | WEIGHT: 150 LBS | DIASTOLIC BLOOD PRESSURE: 81 MMHG | SYSTOLIC BLOOD PRESSURE: 113 MMHG | HEART RATE: 86 BPM | BODY MASS INDEX: 24.99 KG/M2

## 2019-09-12 DIAGNOSIS — M25.531 PAIN IN RIGHT WRIST: ICD-10-CM

## 2019-09-12 PROCEDURE — 99214 OFFICE O/P EST MOD 30 MIN: CPT

## 2019-09-12 PROCEDURE — 73110 X-RAY EXAM OF WRIST: CPT | Mod: RT

## 2019-09-12 NOTE — DISCUSSION/SUMMARY
[de-identified] : Bello returns today for follow up of a wrist injury.  She continues to have significant pain and tenderness at the distal radius despite bracing.  Her x-rays do not demonstrate an obvious fracture.  At this time, I will order an MRI to further evaluate for occult fracture or soft tissue injury.  She can continue to use the brace and she can take NSAIDs as needed. I will follow up with her after the MRI.\par \par Alivia Wade MD, EdM\par Sports Medicine PM&R

## 2019-09-12 NOTE — HISTORY OF PRESENT ILLNESS
[de-identified] : Bello is a 29F who presents with right wrist injury after a fall.  She was last seen on 9/4/19.  At that time, xrays demonstrated a questionable luceny at the radial styloid.  She was place in a wrist brace. Today she reports she continues to have pain that is worse with wrist extension.  The brace helps.  Her swelling has improved, but she continues to have some swelling in the area.  Denies new trauma or worsening symptoms.

## 2019-09-12 NOTE — PHYSICAL EXAM
[de-identified] : Constitutional: Well-nourished, well-developed, No acute distress\par Respiratory:  Good respiratory effort, no SOB\par Lymphatic: No regional lymphadenopathy, no lymphedema\par Psychiatric: Pleasant and normal affect, alert and oriented x3\par Skin: Clean dry and intact B/L UE/LE\par Musculoskeletal: normal except where as noted in regional exam\par \par \par right Wrist:\par APPEARANCE: + swelling at the distal radius\par POSITIVE TENDERNESS: + radial syloid, 3rd, and 4th mcps\par NONTENDER:  scapholunate, DRUJ, TFCC, FCU/FCR, ECU/ECRL/ECRB, ulnar styloid, CMC, and MCP joints, anatomic snuffbox\par ROM: limited in all directions due to pain\par RESISTIVE TESTING: pain with wrist flexion/extension, and radial/ulnar deviation. \par Vasc: 2+ radial pulse\par Neuro: AIN, PIN, Ulnar nerve intact to motor\par Sensation: Intact to light touch throughout\par B/L Shoulders:  No asymmetry, malalignment, or swelling, Full ROM, 5/5 strength in flexion/ext, Abd/Add, IR/ER, Joints stable\par B/L Elbows:  No asymmetry, malalignment, or swelling, Full ROM, 5/5 strength in flex/ext, pronation/supination, Joints stable\par \par  [de-identified] : \par The following radiographs were ordered and read by me during this patient's visit. I reviewed each radiograph in detail with the patient and discussed the findings as highlighted below. \par No acute fracture seen.  There is no displacement or malalignment. There are no degenerative changes seen. There is no malalignment. No other obvious osseous abnormality. Otherwise unremarkable.

## 2019-09-19 ENCOUNTER — FORM ENCOUNTER (OUTPATIENT)
Age: 29
End: 2019-09-19

## 2019-09-20 ENCOUNTER — OUTPATIENT (OUTPATIENT)
Dept: OUTPATIENT SERVICES | Facility: HOSPITAL | Age: 29
LOS: 1 days | End: 2019-09-20
Payer: COMMERCIAL

## 2019-09-20 ENCOUNTER — APPOINTMENT (OUTPATIENT)
Dept: MRI IMAGING | Facility: CLINIC | Age: 29
End: 2019-09-20
Payer: COMMERCIAL

## 2019-09-20 DIAGNOSIS — M25.531 PAIN IN RIGHT WRIST: ICD-10-CM

## 2019-09-20 PROCEDURE — 73221 MRI JOINT UPR EXTREM W/O DYE: CPT | Mod: 26,RT

## 2019-09-20 PROCEDURE — 73221 MRI JOINT UPR EXTREM W/O DYE: CPT

## 2019-09-30 ENCOUNTER — APPOINTMENT (OUTPATIENT)
Dept: ORTHOPEDIC SURGERY | Facility: CLINIC | Age: 29
End: 2019-09-30
Payer: COMMERCIAL

## 2019-09-30 VITALS
HEART RATE: 94 BPM | BODY MASS INDEX: 24.99 KG/M2 | DIASTOLIC BLOOD PRESSURE: 91 MMHG | SYSTOLIC BLOOD PRESSURE: 125 MMHG | HEIGHT: 65 IN | WEIGHT: 150 LBS

## 2019-09-30 DIAGNOSIS — M25.531 PAIN IN RIGHT WRIST: ICD-10-CM

## 2019-09-30 PROCEDURE — 20606 DRAIN/INJ JOINT/BURSA W/US: CPT | Mod: RT

## 2019-09-30 PROCEDURE — 99214 OFFICE O/P EST MOD 30 MIN: CPT | Mod: 25

## 2019-09-30 NOTE — PHYSICAL EXAM
[de-identified] : Patient comes to today's visit with outside imaging already performed.  I reviewed the images in detail with the patient and discussed the findings as highlighted below. \par \par MRI of the right wrist dated 9/20/19:\par \par "No acute traumatic injury.  Specifically, no fractures identified.  1.2cm ganglion cyst along the volar aspect of the distal radius.  Separate 4mm ganglion along the volar aspect of the proximal scaphoid" [de-identified] : Constitutional: Well-nourished, well-developed, No acute distress\par Respiratory:  Good respiratory effort, no SOB\par Lymphatic: No regional lymphadenopathy, no lymphedema\par Psychiatric: Pleasant and normal affect, alert and oriented x3\par Skin: Clean dry and intact B/L UE/LE\par Musculoskeletal: normal except where as noted in regional exam\par \par \par right Wrist:\par APPEARANCE:no swelling\par POSITIVE TENDERNESS:+ dorsal and volar radiocarpal joint\par NONTENDER:  scapholunate, DRUJ, TFCC, FCU/FCR, ECU/ECRL/ECRB, ulnar styloid, CMC, and MCP joints, anatomic snuffbox\par ROM: full, pain with extension\par RESISTIVE TESTING: pain with wrist flexion/extension, and radial/ulnar deviation. \par Vasc: 2+ radial pulse\par Neuro: AIN, PIN, Ulnar nerve intact to motor\par Sensation: Intact to light touch throughout\par B/L Shoulders:  No asymmetry, malalignment, or swelling, Full ROM, 5/5 strength in flexion/ext, Abd/Add, IR/ER, Joints stable\par B/L Elbows:  No asymmetry, malalignment, or swelling, Full ROM, 5/5 strength in flex/ext, pronation/supination, Joints stable\par \par

## 2019-09-30 NOTE — DISCUSSION/SUMMARY
[de-identified] : Bello presents for follow up of right wrist pain.  We discussed the findings of the MRI, which demonstrate no acute findings or fracture.  There are small ganglion cysts present, although they do not necessarily correlate with the area of her pain.  She continues to have dorsal wrist pain that is impacting her ability to perform job.  We discussed treatment options and the patient elected to have a cortisone injection of the wrist.  Procedure was performed today without complication.  Follow up as needed.\par \par Alivia Wade MD, EdM\par Sports Medicine [facility]\par

## 2019-09-30 NOTE — PROCEDURE
[de-identified] : Ultrasound Guided Injection \par Indication for ultrasound guidance: Ensure placement within the knee joint\par \par Utlizing the [employer]come portable ultrasound machine, the Linear 6cm 13-6 MHz transducer and sterile ultrasound gel, ultrasound guidance with the probe in the short axis, utilizing an in plane approach, was used for the following injection:\par \par .Injection: right wrist.\par Indication dorsal wrist impingement.\par \par A discussion was had with the patient regarding this procedure and all questions were answered. All risks, benefits and alternatives were discussed. These included but were not limited to bleeding, infection, and allergic reaction. A timeout was performed prior to the procedure to ensure proper side.  Chlorhexidine was used to clean and sterilize the skin over the dorsal aspect of the wrist overlying the radial carpal joint. A 25-gauge needle was used to inject 0.5cc of 0.5% marcaine and 1cc of 6mg/mL betamethasone into the radiocarpal joint. A sterile bandage was then applied. The patient tolerated the procedure well and there were no complications. \par

## 2019-09-30 NOTE — HISTORY OF PRESENT ILLNESS
[de-identified] : Bello is a 29F who presents with right wrist injury after a fall.  She was last seen on 9/12/19.  At that time an MRI of the wrist was ordered which was essentially negative for acute injury, although there were small ganglion cysts visualized.  She continues to have dorsal wrist pain with wrist extension.  Pain is particularly bothersome while she works, as she uses her hands all day.  Denies new trauma or worsening symptoms.

## 2020-09-29 NOTE — ED PROVIDER NOTE - ENMT, MLM
Deep Sutures: 3-0 Vicryl Airway patent, Nasal mucosa clear. Mouth with normal mucosa. Throat has no vesicles, no oropharyngeal exudates and uvula is midline.

## 2021-09-14 ENCOUNTER — EMERGENCY (EMERGENCY)
Facility: HOSPITAL | Age: 31
LOS: 1 days | Discharge: HOME | End: 2021-09-14
Attending: EMERGENCY MEDICINE
Payer: COMMERCIAL

## 2021-09-14 VITALS
SYSTOLIC BLOOD PRESSURE: 111 MMHG | RESPIRATION RATE: 18 BRPM | DIASTOLIC BLOOD PRESSURE: 73 MMHG | HEIGHT: 65 IN | WEIGHT: 169.98 LBS | HEART RATE: 93 BPM | OXYGEN SATURATION: 99 % | TEMPERATURE: 98 F

## 2021-09-14 LAB
ALBUMIN SERPL ELPH-MCNC: 4.4 G/DL — SIGNIFICANT CHANGE UP (ref 3.3–5)
ALP SERPL-CCNC: 56 U/L — SIGNIFICANT CHANGE UP (ref 40–120)
ALT FLD-CCNC: 12 U/L — SIGNIFICANT CHANGE UP (ref 10–45)
ANION GAP SERPL CALC-SCNC: 13 MMOL/L — SIGNIFICANT CHANGE UP (ref 5–17)
ANISOCYTOSIS BLD QL: SLIGHT — SIGNIFICANT CHANGE UP
AST SERPL-CCNC: 11 U/L — SIGNIFICANT CHANGE UP (ref 10–40)
BASOPHILS # BLD AUTO: 0 K/UL — SIGNIFICANT CHANGE UP (ref 0–0.2)
BASOPHILS NFR BLD AUTO: 0 % — SIGNIFICANT CHANGE UP (ref 0–2)
BILIRUB SERPL-MCNC: 0.2 MG/DL — SIGNIFICANT CHANGE UP (ref 0.2–1.2)
BUN SERPL-MCNC: 12 MG/DL — SIGNIFICANT CHANGE UP (ref 7–23)
CALCIUM SERPL-MCNC: 9.8 MG/DL — SIGNIFICANT CHANGE UP (ref 8.4–10.5)
CHLORIDE SERPL-SCNC: 102 MMOL/L — SIGNIFICANT CHANGE UP (ref 96–108)
CO2 SERPL-SCNC: 21 MMOL/L — LOW (ref 22–31)
CREAT SERPL-MCNC: 0.63 MG/DL — SIGNIFICANT CHANGE UP (ref 0.5–1.3)
EOSINOPHIL # BLD AUTO: 0 K/UL — SIGNIFICANT CHANGE UP (ref 0–0.5)
EOSINOPHIL NFR BLD AUTO: 0 % — SIGNIFICANT CHANGE UP (ref 0–6)
GLUCOSE SERPL-MCNC: 103 MG/DL — HIGH (ref 70–99)
HCG SERPL-ACNC: HIGH MIU/ML
HCT VFR BLD CALC: 35.6 % — SIGNIFICANT CHANGE UP (ref 34.5–45)
HGB BLD-MCNC: 11.7 G/DL — SIGNIFICANT CHANGE UP (ref 11.5–15.5)
LYMPHOCYTES # BLD AUTO: 1.78 K/UL — SIGNIFICANT CHANGE UP (ref 1–3.3)
LYMPHOCYTES # BLD AUTO: 16.8 % — SIGNIFICANT CHANGE UP (ref 13–44)
MANUAL SMEAR VERIFICATION: SIGNIFICANT CHANGE UP
MCHC RBC-ENTMCNC: 28.3 PG — SIGNIFICANT CHANGE UP (ref 27–34)
MCHC RBC-ENTMCNC: 32.9 GM/DL — SIGNIFICANT CHANGE UP (ref 32–36)
MCV RBC AUTO: 86 FL — SIGNIFICANT CHANGE UP (ref 80–100)
MONOCYTES # BLD AUTO: 0.47 K/UL — SIGNIFICANT CHANGE UP (ref 0–0.9)
MONOCYTES NFR BLD AUTO: 4.4 % — SIGNIFICANT CHANGE UP (ref 2–14)
NEUTROPHILS # BLD AUTO: 8.33 K/UL — HIGH (ref 1.8–7.4)
NEUTROPHILS NFR BLD AUTO: 78.8 % — HIGH (ref 43–77)
NEUTS HYPERSEG # BLD: PRESENT — SIGNIFICANT CHANGE UP
OVALOCYTES BLD QL SMEAR: SLIGHT — SIGNIFICANT CHANGE UP
PLAT MORPH BLD: NORMAL — SIGNIFICANT CHANGE UP
PLATELET # BLD AUTO: 272 K/UL — SIGNIFICANT CHANGE UP (ref 150–400)
POIKILOCYTOSIS BLD QL AUTO: SLIGHT — SIGNIFICANT CHANGE UP
POTASSIUM SERPL-MCNC: 3.6 MMOL/L — SIGNIFICANT CHANGE UP (ref 3.5–5.3)
POTASSIUM SERPL-SCNC: 3.6 MMOL/L — SIGNIFICANT CHANGE UP (ref 3.5–5.3)
PROT SERPL-MCNC: 7.4 G/DL — SIGNIFICANT CHANGE UP (ref 6–8.3)
RBC # BLD: 4.14 M/UL — SIGNIFICANT CHANGE UP (ref 3.8–5.2)
RBC # FLD: 12.7 % — SIGNIFICANT CHANGE UP (ref 10.3–14.5)
RBC BLD AUTO: ABNORMAL
SODIUM SERPL-SCNC: 136 MMOL/L — SIGNIFICANT CHANGE UP (ref 135–145)
WBC # BLD: 10.57 K/UL — HIGH (ref 3.8–10.5)
WBC # FLD AUTO: 10.57 K/UL — HIGH (ref 3.8–10.5)

## 2021-09-14 PROCEDURE — 76802 OB US < 14 WKS ADDL FETUS: CPT

## 2021-09-14 PROCEDURE — 84702 CHORIONIC GONADOTROPIN TEST: CPT

## 2021-09-14 PROCEDURE — 80053 COMPREHEN METABOLIC PANEL: CPT

## 2021-09-14 PROCEDURE — 76815 OB US LIMITED FETUS(S): CPT | Mod: 26

## 2021-09-14 PROCEDURE — 85025 COMPLETE CBC W/AUTO DIFF WBC: CPT

## 2021-09-14 PROCEDURE — 99284 EMERGENCY DEPT VISIT MOD MDM: CPT | Mod: 25

## 2021-09-14 PROCEDURE — 99285 EMERGENCY DEPT VISIT HI MDM: CPT

## 2021-09-14 PROCEDURE — 76801 OB US < 14 WKS SINGLE FETUS: CPT

## 2021-09-14 NOTE — ED PROVIDER NOTE - NS ED ROS FT
CONSTITUTIONAL: No fevers, no chills, no lightheadedness, no dizziness  EYES: no visual changes, no eye pain  EARS:  no change in hearing  NOSE: no nasal congestion  MOUTH/THROAT: no sore throat  CV: No chest pain, no palpitations  RESP: No SOB, no cough  GI: No n/v/d, no abd pain  : no dysuria, no hematuria, no flank pain  MSK: no back pain, no extremity pain  SKIN: no rashes  NEURO: no headache, no focal weakness, no decreased sensation/parasthesias   PSYCHIATRIC: no known mental health issues

## 2021-09-14 NOTE — ED PROVIDER NOTE - CLINICAL SUMMARY MEDICAL DECISION MAKING FREE TEXT BOX
Patient 30 y/o 7 wks,  F with no significant PMH  who presents with concern of IUD placement. Last confirmation 1 yr ago. Patient no c/o vag pain, bleeding or discharge. Will get basic labs, hcg and TVUS.

## 2021-09-14 NOTE — ED PROVIDER NOTE - ATTENDING CONTRIBUTION TO CARE
31F here with possibly displaced IUD. Pt states Mirena placed 4 years ago, last verified ~1 year ago, presented to [facility] today for evaluation for TOP. Pt is ~7 weeks gestation. Pt had an US confirming IUP and a pelvic exam where the clinician was unable to locate the IUD strings. Pt was referred to her GYN for further evaluation of IUD placement prior to [facility] being able to provide medication for termination. Pt denies abd pain, VB, fevers, vomiting. Pelvic exam  without identifiable strings, cervix appears normal, scant cervical mucous. ABd soft ntnd. Will obtain TVUS to eval for IUD, d/w pt's OB, likely DC.

## 2021-09-14 NOTE — ED PROVIDER NOTE - PATIENT PORTAL LINK FT
You can access the FollowMyHealth Patient Portal offered by [facility] by registering at the following website: [email or web address]. By joining [facility] FollowMyHealth portal, you will also be able to view your health information using other applications (apps) compatible with our system.

## 2021-09-14 NOTE — ED PROVIDER NOTE - PHYSICAL EXAMINATION
General: Alert and Orientated x 3. No apparent distress.  Head: Normocephalic and atraumatic.  Eyes: PERRLA with EOMI.  Neck: Supple. Trachea midline.   Cardiac: Normal S1 and S2 w/ RRR. No murmurs appreciated. No JVD appreciated.  Pulmonary: Vesicular breath sounds bilaterally. No increased WOB. No wheezes or crackles.  Abdominal: Soft, non-tender. (+) bowel sounds appreciated in all 4 quadrants. No hepatosplenomegaly.   Neurologic: No focal sensory or motor deficits.  Musculoskeletal: Strength appropriate in all 4 extremities for age with no limited ROM.  Skin: Color appropriate for race. Intact, warm, and well-perfused.  Psychiatric: Appropriate mood and affect. No apparent risk to self or others.  : Chaperone Dr. Sinha cervical os visualized closed. No blood in vaginal vault, physiological discharge. IUD strings not visualized.

## 2021-09-14 NOTE — ED ADULT NURSE NOTE - OBJECTIVE STATEMENT
31 year old female amb to ED with LMP 7/30 . Unable to locate Merina ring after trying to Terminate pregnancy . Denies abdominal pain, f/c, nausea, vomiting or vaginal bleeding or discharge.

## 2021-09-14 NOTE — ED PROVIDER NOTE - OBJECTIVE STATEMENT
Patient 30 y/o 7 wks,  F with no significant PMH who presents to the ED from [facility] for concern for IUD placement. Lmp ~. Confirmed IUP. JESUS unknown. States she went to the above clinic to terminate her pregnancy. States that she was unable to move forward with this as US and pelvic exam could not locate the Merina IUD. Places 4 yrs ago, last confirmed 1 yr ago. No abdominal pain, f/c, nausea, vomiting or vaginal bleeding or discharge.

## 2021-09-14 NOTE — ED PROVIDER NOTE - NSFOLLOWUPINSTRUCTIONS_ED_ALL_ED_FT
You were seen in the Emergency Department for concern of IUD placement. Imaging using a Transvaginal Ultrasound was performed. This did not show an IUD in place. A written copy of these results have been provided to you. Please follow with your OB/GYN and [facility] for further management of your pregnancy. Follow with your primary care provider. Take home meds as prescribed.     Return to the Emergency Department if you experience any of the following:  Vaginal bleeding, vaginal discharge, pelvic pain, chest pain, abdominal pain, fever, chills, nausea, vomiting or any concerning symptoms.

## 2022-07-07 ENCOUNTER — APPOINTMENT (OUTPATIENT)
Dept: SURGERY | Facility: CLINIC | Age: 32
End: 2022-07-07

## 2022-07-07 DIAGNOSIS — Z83.49 FAMILY HISTORY OF OTHER ENDOCRINE, NUTRITIONAL AND METABOLIC DISEASES: ICD-10-CM

## 2022-07-07 DIAGNOSIS — E06.3 AUTOIMMUNE THYROIDITIS: ICD-10-CM

## 2022-07-07 PROCEDURE — 99203 OFFICE O/P NEW LOW 30 MIN: CPT

## 2022-07-08 VITALS — WEIGHT: 160 LBS | BODY MASS INDEX: 26.66 KG/M2 | HEIGHT: 65 IN

## 2022-07-08 PROBLEM — Z83.49 FAMILY HISTORY OF GOITER: Status: ACTIVE | Noted: 2022-07-08

## 2022-07-08 RX ORDER — ALPRAZOLAM 1 MG/1
1 TABLET ORAL
Qty: 120 | Refills: 0 | Status: ACTIVE | COMMUNITY
Start: 2022-06-28

## 2022-07-08 RX ORDER — VENLAFAXINE HYDROCHLORIDE 150 MG/1
150 CAPSULE, EXTENDED RELEASE ORAL
Qty: 60 | Refills: 0 | Status: ACTIVE | COMMUNITY
Start: 2021-07-15

## 2022-07-08 NOTE — ASSESSMENT
[FreeTextEntry1] : Patient's with complaint of hoarseness, sore throat and worsening snoring.  Her physical exam is consistent with thyroiditis.  I have requested a thyroid ultrasound to rule out suspicious nodules.  The patient will be evaluated by a gastroenterologist for her swallowing complaints and undergo work-up for sleep apnea.  If ultrasound does not show any suspicious findings, I will consider possible CT scan to rule out tracheal narrowing.  I have answered her questions to the best my ability.

## 2022-07-08 NOTE — PHYSICAL EXAM
[de-identified] : No cervical or supraclavicular adenopathy, thyroid firm, mildly enlarged without discrete irregular mass.  No cervical or supraclavicular adenopathy. [Normal] : no neck adenopathy [de-identified] : Skin:  normal appearance.  no rash, nodules, vesicles, or erythema,\par Musculoskeletal:  full range of motion and no deformities appreciated\par Neurological:  grossly intact\par Psychiatric:  oriented to person, place and time with appropriate affect

## 2022-07-08 NOTE — CONSULT LETTER
[Dear  ___] : Dear  [unfilled], [Consult Letter:] : I had the pleasure of evaluating your patient, [unfilled]. [Please see my note below.] : Please see my note below. [Consult Closing:] : Thank you very much for allowing me to participate in the care of this patient.  If you have any questions, please do not hesitate to contact me. [Sincerely,] : Sincerely, [FreeTextEntry2] : Dr. Magda Lieberman [FreeTextEntry3] : Sindy Hill MD, FACS\par Assistant Professor of Surgery and Otolaryngology\par [facility]\par  [DrNoelle  ___] : Dr. HUDSON

## 2022-07-08 NOTE — HISTORY OF PRESENT ILLNESS
[de-identified] : Patient referred by Dr. Lieberman for evaluation of thyroid enlargement.  Patient reports a 1 year history of worsening snoring with hoarseness and sore throat.  Patient was evaluated by ENT but unable to pass scope through the patient's throat due to inflammation.  Patient reports prior thyroid ultrasound revealed nodules but denies prior biopsy.  Reports not available.  Patient denies radiation exposure.

## 2022-07-08 NOTE — REASON FOR VISIT
[Initial Consultation] : an initial consultation for [Other: _____] : [unfilled] [FreeTextEntry2] : Thyroid enlargement

## 2022-07-12 ENCOUNTER — NON-APPOINTMENT (OUTPATIENT)
Age: 32
End: 2022-07-12

## 2023-01-10 ENCOUNTER — APPOINTMENT (OUTPATIENT)
Dept: SURGERY | Facility: CLINIC | Age: 33
End: 2023-01-10

## 2023-04-05 NOTE — DISCHARGE NOTE OB - HAS THE PATIENT RECEIVED THE INFLUENZA VACCINE DURING THIS VISIT
Specific Instructions for next treatment: work on stretching of the lumbar & hip regions, work on proximal strengthening (get relief with PPT), progress to standing as tolerated       Assessment: [x] Progressing toward goals. Continued with program to increase mobility and lumbar stability. Progressed with added exercises with resistance and loading with seated on dynadisc. Pt is feeling this is challenging the core. She does well maintaining her upright alignment. Tried some standing but pt get cramping in the lumbar paraspinals which limits reps. Will continue to monitor and progress standing tolerance. Overall pt feeling there is improvement and she is getting stronger with PT.     [] No change. [] Other:     [x] Patient would continue to benefit from skilled physical therapy services in order to: B hip mobility & strength, increase posterior chain and core strengthening, improve lumbar mobility, and increase functional capacity to improve her abilities to complete daily mobility. STG: (to be met in 6 treatments)  Pt will self report worst pain no greater than 6/10 in the low back by end of sessions in order to better tolerate ADLs/work activities with minimal dysfunction  Pt will improve AROM in all lumbar spine planes >75% with minimal pain increase  in order to demonstrate ability to move/reach in all planes unrestricted at PLOF. Pt will improve B hip flexion to > 110 deg to be able to sit to lower surfaces. Pt will improve B Hip IR/ER to > 20 deg to demonstrate improving ranges to put on her shoes & complete mobility. Pt will be able to maintain a TrAB contraction with functional standing exercises to improve trunk control with mobility. LTG: (to be met in 12 treatments)   Pt will increase strength of all major muscle groups of the LEs to 4+/5 or greater demonstrating improved strength needed to maximize safety and efficiency with mobility tasks such as gait, transfers and stairs.      Pt
No

## 2024-11-13 NOTE — ED ADULT NURSE NOTE - CAS EDN DISCHARGE ASSESSMENT
Dr. Amaral rounding and updated on her status, v/s, post op CBG of 245.  No new orders given.   Alert and oriented to person, place and time